# Patient Record
Sex: FEMALE | Race: WHITE | NOT HISPANIC OR LATINO | Employment: PART TIME | URBAN - METROPOLITAN AREA
[De-identification: names, ages, dates, MRNs, and addresses within clinical notes are randomized per-mention and may not be internally consistent; named-entity substitution may affect disease eponyms.]

---

## 2023-10-16 ENCOUNTER — HOSPITAL ENCOUNTER (EMERGENCY)
Facility: HOSPITAL | Age: 23
Discharge: HOME | End: 2023-10-16
Attending: EMERGENCY MEDICINE | Admitting: EMERGENCY MEDICINE

## 2023-10-16 VITALS
BODY MASS INDEX: 39.27 KG/M2 | WEIGHT: 230 LBS | DIASTOLIC BLOOD PRESSURE: 85 MMHG | RESPIRATION RATE: 16 BRPM | OXYGEN SATURATION: 98 % | TEMPERATURE: 98.1 F | SYSTOLIC BLOOD PRESSURE: 150 MMHG | HEIGHT: 64 IN | HEART RATE: 89 BPM

## 2023-10-16 DIAGNOSIS — J06.9 URI (UPPER RESPIRATORY INFECTION): Primary | ICD-10-CM

## 2023-10-16 LAB
FLUAV RNA RESP QL NAA+PROBE: NEGATIVE
FLUBV RNA RESP QL NAA+PROBE: NEGATIVE
RSV RNA RESP QL NAA+PROBE: NEGATIVE
SARS-COV-2 RNA RESP QL NAA+PROBE: NEGATIVE

## 2023-10-16 PROCEDURE — 0241U HB NFCT DS VIR RESP RNA 4 TRGT: CPT | Performed by: EMERGENCY MEDICINE

## 2023-10-16 RX ORDER — IBUPROFEN 600 MG/1
600 TABLET ORAL ONCE
Status: COMPLETED | OUTPATIENT
Start: 2023-10-16 | End: 2023-10-16

## 2023-10-16 RX ADMIN — IBUPROFEN 600 MG: 600 TABLET, FILM COATED ORAL at 15:06

## 2023-10-16 NOTE — Clinical Note
Amleia Romero was seen and treated in our emergency department on 10/16/2023. No restrictions            Diagnosis: SHIRLEY Rayo  may return to school on return date, may return to work on return date. She may return on this date: 10/18/2023    If viral testing is positive please follow CDC guidelines for returning to work and school. If you have any questions or concerns, please don't hesitate to call.       Constanza Su MD    ______________________________           _______________          _______________  Hospital Representative                              Date                                Time

## 2023-10-16 NOTE — ED PROVIDER NOTES
HPI: Patient is a 23 y.o. female who presents with 2 days of cough, headache, sore throat, myalgias, and congestion  which the patient describes as mild. The patient has not had contact with people with similar symptoms. The patient has not taken any medication. Allergies   Allergen Reactions    Latex Rash       History reviewed. No pertinent past medical history. History reviewed. No pertinent surgical history. Social History     Tobacco Use    Smoking status: Never    Smokeless tobacco: Never   Vaping Use    Vaping Use: Every day   Substance Use Topics    Alcohol use: Never    Drug use: Never       Nursing notes reviewed  Physical Exam:  ED Triage Vitals   Temperature Pulse Respirations Blood Pressure SpO2   10/16/23 1435 10/16/23 1435 10/16/23 1435 10/16/23 1440 10/16/23 1435   98.1 °F (36.7 °C) 89 16 150/85 98 %      Temp Source Heart Rate Source Patient Position - Orthostatic VS BP Location FiO2 (%)   10/16/23 1435 10/16/23 1435 10/16/23 1435 10/16/23 1435 --   Oral Monitor Sitting Left arm       Pain Score       10/16/23 1435       4           ROS: Positive for as stated above in HPI, the remainder of a 10 organ system ROS was otherwise unremarkable. General: awake, alert, no acute distress  Head: normocephalic, atraumatic  Eyes: no scleral icterus  Ears: external ears normal, hearing grossly intact  Nose: external exam grossly normal, negative nasal discharge  Neck: symmetric, No JVD noted, trachea midline  Pulmonary: no respiratory distress, no tachypnea noted, CTAB  Cardiovascular: appears well perfused, RRR  Abdomen: no distention noted  Musculoskeletal: no deformities noted, tone normal  Neuro: grossly non-focal  Psych: mood and affect appropriate    Medical Decision Making  The patient is stable and has a history and physical exam consistent with a viral illness. COVID19 testing has been performed.   I considered the patient's other medical conditions as applicable/noted above in my medical decision making. The patient is stable upon discharge. The plan is for supportive care at home. The patient (and any family present) verbalized understanding of the discharge instructions and warnings that would necessitate return to the Emergency Department. All questions were answered prior to discharge. Risk  Prescription drug management. Medications   ibuprofen (MOTRIN) tablet 600 mg (600 mg Oral Given 10/16/23 0360)     Final diagnoses:   URI (upper respiratory infection)     Time reflects when diagnosis was documented in both MDM as applicable and the Disposition within this note       Time User Action Codes Description Comment    10/16/2023  3:10 PM Marquez Goldstein Add [J06.9] URI (upper respiratory infection)           ED Disposition       ED Disposition   Discharge    Condition   Stable    Date/Time   Mon Oct 16, 2023  3:10 PM    [address] [place] [address] discharge to home/self care. Follow-up Information       Follow up With Specialties Details Why Contact Info Additional [address] [facility],[address] Emergency Department Emergency Medicine Go to  If symptoms worsen [address] [phone]  [address] Emergency Department, [address] [place] [address], [facility]          There are no discharge medications for this patient. No discharge procedures on file.     Electronically Signed by       Dorsey Kussmaul, MD  10/16/23 9881

## 2024-09-15 ENCOUNTER — HOSPITAL ENCOUNTER (EMERGENCY)
Facility: HOSPITAL | Age: 24
Discharge: HOME | End: 2024-09-15
Attending: INTERNAL MEDICINE | Admitting: INTERNAL MEDICINE
Payer: COMMERCIAL

## 2024-09-15 ENCOUNTER — APPOINTMENT (EMERGENCY)
Dept: CT IMAGING | Facility: HOSPITAL | Age: 24
End: 2024-09-15
Payer: COMMERCIAL

## 2024-09-15 ENCOUNTER — APPOINTMENT (EMERGENCY)
Dept: RADIOLOGY | Facility: HOSPITAL | Age: 24
End: 2024-09-15
Payer: COMMERCIAL

## 2024-09-15 VITALS
TEMPERATURE: 99.3 F | WEIGHT: 245 LBS | HEART RATE: 93 BPM | SYSTOLIC BLOOD PRESSURE: 133 MMHG | RESPIRATION RATE: 20 BRPM | DIASTOLIC BLOOD PRESSURE: 74 MMHG | OXYGEN SATURATION: 98 % | BODY MASS INDEX: 41.83 KG/M2 | HEIGHT: 64 IN
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
## 2024-09-15 DIAGNOSIS — S16.1XXA STRAIN OF NECK MUSCLE, INITIAL ENCOUNTER: ICD-10-CM

## 2024-09-15 DIAGNOSIS — S46.919A SHOULDER STRAIN: ICD-10-CM

## 2024-09-15 LAB
EXT PREGNANCY TEST URINE: NEGATIVE
EXT. CONTROL: NORMAL

## 2024-09-15 PROCEDURE — 73060 X-RAY EXAM OF HUMERUS: CPT

## 2024-09-15 PROCEDURE — 96372 THER/PROPH/DIAG INJ SC/IM: CPT

## 2024-09-15 PROCEDURE — 73030 X-RAY EXAM OF SHOULDER: CPT

## 2024-09-15 PROCEDURE — 72125 CT NECK SPINE W/O DYE: CPT

## 2024-09-15 PROCEDURE — 81025 URINE PREGNANCY TEST: CPT | Performed by: PHYSICIAN ASSISTANT

## 2024-09-15 PROCEDURE — 99284 EMERGENCY DEPT VISIT MOD MDM: CPT

## 2024-09-15 PROCEDURE — 99284 EMERGENCY DEPT VISIT MOD MDM: CPT | Performed by: PHYSICIAN ASSISTANT

## 2024-09-15 RX ORDER — KETOROLAC TROMETHAMINE 30 MG/ML
15 INJECTION, SOLUTION INTRAMUSCULAR; INTRAVENOUS ONCE
Status: COMPLETED | OUTPATIENT
Start: 2024-09-15 | End: 2024-09-15

## 2024-09-15 RX ORDER — NAPROXEN 500 MG/1
500 TABLET ORAL 2 TIMES DAILY WITH MEALS
Qty: 30 TABLET | Refills: 0 | Status: SHIPPED | OUTPATIENT
Start: 2024-09-15

## 2024-09-15 RX ORDER — METHOCARBAMOL 500 MG/1
500 TABLET, FILM COATED ORAL 4 TIMES DAILY
Qty: 20 TABLET | Refills: 0 | Status: SHIPPED | OUTPATIENT
Start: 2024-09-15

## 2024-09-15 RX ORDER — LIDOCAINE 50 MG/G
2 PATCH TOPICAL ONCE
Status: DISCONTINUED | OUTPATIENT
Start: 2024-09-15 | End: 2024-09-15 | Stop reason: HOSPADM

## 2024-09-15 RX ADMIN — KETOROLAC TROMETHAMINE 15 MG: 30 INJECTION, SOLUTION INTRAMUSCULAR at 15:42

## 2024-09-15 RX ADMIN — LIDOCAINE 2 PATCH: 50 PATCH CUTANEOUS at 15:45

## 2024-09-15 NOTE — DISCHARGE INSTRUCTIONS
Please return to the emergency department for worsening symptoms including chest pain, shortness of breath, dizziness, lightheadedness, fever greater than 103, severe pain, inability to walk, fainting episodes, etc..  Please follow-up with your family practice provider as soon as possible.  I have sent medications over to the pharmacy for your symptoms.  Please take as directed.  Robaxin as a muscle relaxer that will help decrease her pain.  Please to not operate motor machinery or go to work while taking this medication as it can make you drowsy.  If neck pain persists, follow-up with the [facility].  I have given you a referral.

## 2024-09-15 NOTE — Clinical Note
Jocelynn Street was seen and treated in our emergency department on 9/15/2024.                Diagnosis:     Jocelynn  .    She may return on this date:     Please excuse this individual on 9/16/2024.     If you have any questions or concerns, please don't hesitate to call.      Poncho Rivera PA-C    ______________________________           _______________          _______________  Hospital Representative                              Date                                Time
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
## 2024-09-16 ENCOUNTER — TELEPHONE (OUTPATIENT)
Dept: PHYSICAL THERAPY | Facility: OTHER | Age: 24
End: 2024-09-16
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
## 2025-02-20 ENCOUNTER — TELEPHONE (OUTPATIENT)
Dept: OTHER | Facility: OTHER | Age: 25
End: 2025-02-20

## 2025-02-20 NOTE — TELEPHONE ENCOUNTER
Progress Note:      Mammogram Screening ([facility]):  Pap smear screening (Clinic vs [facility] vs [facility]):  PCP (Clinic vs [facility] vs [facility]):     Transportation:     Education:Left message for patient to return call for assistance with making appointments or getting access to medical care.

## 2025-04-23 ENCOUNTER — OFFICE VISIT (OUTPATIENT)
Dept: FAMILY MEDICINE CLINIC | Facility: CLINIC | Age: 25
End: 2025-04-23

## 2025-04-23 VITALS
RESPIRATION RATE: 18 BRPM | WEIGHT: 267.6 LBS | HEIGHT: 64 IN | DIASTOLIC BLOOD PRESSURE: 84 MMHG | BODY MASS INDEX: 45.68 KG/M2 | HEART RATE: 87 BPM | SYSTOLIC BLOOD PRESSURE: 128 MMHG | TEMPERATURE: 98.2 F | OXYGEN SATURATION: 98 %

## 2025-04-23 DIAGNOSIS — Z13.6 ENCOUNTER FOR LIPID SCREENING FOR CARDIOVASCULAR DISEASE: ICD-10-CM

## 2025-04-23 DIAGNOSIS — Z00.00 ANNUAL PHYSICAL EXAM: Primary | ICD-10-CM

## 2025-04-23 DIAGNOSIS — Z71.3 WEIGHT LOSS COUNSELING, ENCOUNTER FOR: ICD-10-CM

## 2025-04-23 DIAGNOSIS — Z13.220 ENCOUNTER FOR LIPID SCREENING FOR CARDIOVASCULAR DISEASE: ICD-10-CM

## 2025-04-23 PROCEDURE — 99385 PREV VISIT NEW AGE 18-39: CPT | Performed by: FAMILY MEDICINE

## 2025-04-23 NOTE — ASSESSMENT & PLAN NOTE
Patient has family hx for paternal grandfather passing away due to MI in his 50s. Patient's father has HTN. Baseline lipid panel screening ordered.   Orders:    Lipid panel; Future

## 2025-04-23 NOTE — ASSESSMENT & PLAN NOTE
Patient is overall healthy 24 y.o. female who is here to establish care. HIV and HCV testing ordered today. Patient would like to set up a follow-up appointment for screening pap smear. No concerns or complaints today.    Plan:  - Follow-up for screening pap smear  - See plan under weight loss   Orders:    HIV 1/2 AG/AB w Reflex [facility] for 2 yr old and above; Future    Hepatitis C antibody; Future

## 2025-04-23 NOTE — PROGRESS NOTES
Adult Annual Physical  Name: Jocelynn Street      : 2000      MRN: [id number]  Encounter Provider: Sourav Galvez DO  Encounter Date: 2025   Encounter department: [facility]    :  Assessment & Plan  Annual physical exam  Patient is overall healthy 24 y.o. female who is here to establish care. HIV and HCV testing ordered today. Patient would like to set up a follow-up appointment for screening pap smear. No concerns or complaints today.    Plan:  - Follow-up for screening pap smear  - See plan under weight loss   Orders:    HIV 1/2 AG/AB w Reflex [facility] for 2 yr old and above; Future    Hepatitis C antibody; Future    Weight loss counseling, encounter for  Patient encouraged to implement lifestyle changes and follow-up in 4 weeks. Portion control discussed with pt. She reports she walks for about 30min for 3x a week. Encouraged to increase this to daily.        Encounter for lipid screening for cardiovascular disease  Patient has family hx for paternal grandfather passing away due to MI in his 50s. Patient's father has HTN. Baseline lipid panel screening ordered.   Orders:    Lipid panel; Future        Preventive Screenings:  - Diabetes Screening: screening not indicated  - Cholesterol Screening: screening not indicated   - Chlamydia Screening: patient declines   - Hepatitis C screening: orders placed   - HIV screening: orders placed   - Cervical cancer screening: risks/benefits discussed   - Colon cancer screening: screening not indicated   - Lung cancer screening: screening not indicated     Immunizations:  - Immunizations due: Influenza, Tdap and HPV (Gardasil 9)    BMI Counseling: Body mass index is 45.93 kg/m². The BMI is above normal. Nutrition recommendations include decreasing portion sizes, consuming healthier snacks and limiting drinks that contain sugar. Exercise recommendations include moderate physical activity 150 minutes/week. No pharmacotherapy  "was ordered. Rationale for BMI follow-up plan is due to patient being overweight or obese.     Depression Screening and Follow-up Plan: Patient was screened for depression during today's encounter. They screened negative with a PHQ-2 score of 1.          History of Present Illness     Adult Annual Physical:  Patient presents for annual physical. Patient is a 24 y.o. female with no relevant PMHx who is here to establish care. She denies taking any medications. Patient reports she currently attends community college.  Pertinent family hx is father with HTN and grandfather passed away from MI in his 50s. Patient has no concerns or complaints today.       .     Diet and Physical Activity:  - Diet/Nutrition: well balanced diet and low carb diet.  - Exercise: walking and no formal exercise.    Depression Screening:  - PHQ-2 Score: 1    General Health:  - Sleep: 4-6 hours of sleep on average.  - Hearing: normal hearing bilateral ears.  - Vision: wears glasses and most recent eye exam < 1 year ago.  - Dental: no dental visits for > 1 year and brushes teeth twice daily.    /GYN Health:  - Follows with GYN: no.   - Last menstrual cycle: 3/26/2025.   - History of STDs: no    Review of Systems   Constitutional:  Negative for chills and fever.   HENT:  Negative for ear pain and sore throat.    Eyes:  Negative for pain and visual disturbance.   Respiratory:  Negative for cough and shortness of breath.    Cardiovascular:  Negative for chest pain and palpitations.   Gastrointestinal:  Negative for abdominal pain and vomiting.   Genitourinary:  Negative for dysuria and hematuria.   Skin:  Negative for color change and rash.   Neurological:  Negative for seizures and syncope.   All other systems reviewed and are negative.        Objective   /84 (BP Location: Left arm, Patient Position: Sitting, Cuff Size: Large)   Pulse 87   Temp 98.2 °F (36.8 °C) (Temporal)   Resp 18   Ht 5' 4\" (1.626 m)   Wt 121 kg (267 lb 9.6 oz)   " SpO2 98%   BMI 45.93 kg/m²     Physical Exam  Vitals and nursing note reviewed.   Constitutional:       General: She is not in acute distress.     Appearance: She is well-developed.   HENT:      Head: Normocephalic and atraumatic.      Mouth/Throat:      Mouth: Mucous membranes are moist.      Pharynx: Oropharynx is clear.   Eyes:      Conjunctiva/sclera: Conjunctivae normal.   Cardiovascular:      Rate and Rhythm: Normal rate and regular rhythm.      Heart sounds: No murmur heard.  Pulmonary:      Effort: Pulmonary effort is normal. No respiratory distress.      Breath sounds: Normal breath sounds.   Abdominal:      Palpations: Abdomen is soft.      Tenderness: There is no abdominal tenderness.   Musculoskeletal:         General: No swelling.      Cervical back: Neck supple.   Skin:     General: Skin is warm and dry.      Capillary Refill: Capillary refill takes less than 2 seconds.   Neurological:      Mental Status: She is alert.   Psychiatric:         Mood and Affect: Mood normal.             Sourav Galvez DO  Family Medicine [place] PGY1

## 2025-04-23 NOTE — ASSESSMENT & PLAN NOTE
Patient encouraged to implement lifestyle changes and follow-up in 4 weeks. Portion control discussed with pt. She reports she walks for about 30min for 3x a week. Encouraged to increase this to daily.

## 2025-05-23 PROBLEM — Z13.220 ENCOUNTER FOR LIPID SCREENING FOR CARDIOVASCULAR DISEASE: Status: RESOLVED | Noted: 2025-04-23 | Resolved: 2025-05-23

## 2025-05-23 PROBLEM — Z13.6 ENCOUNTER FOR LIPID SCREENING FOR CARDIOVASCULAR DISEASE: Status: RESOLVED | Noted: 2025-04-23 | Resolved: 2025-05-23
